# Patient Record
Sex: FEMALE | Race: BLACK OR AFRICAN AMERICAN | NOT HISPANIC OR LATINO | URBAN - NONMETROPOLITAN AREA
[De-identification: names, ages, dates, MRNs, and addresses within clinical notes are randomized per-mention and may not be internally consistent; named-entity substitution may affect disease eponyms.]

---

## 2021-12-29 ENCOUNTER — AMBULATORY SURGICAL CENTER (OUTPATIENT)
Dept: URBAN - NONMETROPOLITAN AREA SURGERY 4 | Facility: SURGERY | Age: 46
End: 2021-12-29

## 2021-12-29 VITALS
DIASTOLIC BLOOD PRESSURE: 61 MMHG | DIASTOLIC BLOOD PRESSURE: 50 MMHG | DIASTOLIC BLOOD PRESSURE: 64 MMHG | HEART RATE: 78 BPM | SYSTOLIC BLOOD PRESSURE: 116 MMHG | SYSTOLIC BLOOD PRESSURE: 95 MMHG | OXYGEN SATURATION: 95 % | HEIGHT: 67 IN | OXYGEN SATURATION: 99 % | DIASTOLIC BLOOD PRESSURE: 59 MMHG | OXYGEN SATURATION: 96 % | DIASTOLIC BLOOD PRESSURE: 64 MMHG | HEART RATE: 72 BPM | HEART RATE: 80 BPM | HEART RATE: 73 BPM | DIASTOLIC BLOOD PRESSURE: 72 MMHG | HEART RATE: 68 BPM | HEART RATE: 73 BPM | OXYGEN SATURATION: 100 % | OXYGEN SATURATION: 99 % | RESPIRATION RATE: 22 BRPM | SYSTOLIC BLOOD PRESSURE: 116 MMHG | WEIGHT: 142 LBS | DIASTOLIC BLOOD PRESSURE: 74 MMHG | SYSTOLIC BLOOD PRESSURE: 106 MMHG | SYSTOLIC BLOOD PRESSURE: 101 MMHG | DIASTOLIC BLOOD PRESSURE: 54 MMHG | OXYGEN SATURATION: 100 % | TEMPERATURE: 98 F | OXYGEN SATURATION: 100 % | SYSTOLIC BLOOD PRESSURE: 109 MMHG | SYSTOLIC BLOOD PRESSURE: 108 MMHG | DIASTOLIC BLOOD PRESSURE: 72 MMHG | SYSTOLIC BLOOD PRESSURE: 90 MMHG | HEART RATE: 68 BPM | TEMPERATURE: 98 F | DIASTOLIC BLOOD PRESSURE: 63 MMHG | DIASTOLIC BLOOD PRESSURE: 51 MMHG | SYSTOLIC BLOOD PRESSURE: 101 MMHG | SYSTOLIC BLOOD PRESSURE: 116 MMHG | RESPIRATION RATE: 16 BRPM | SYSTOLIC BLOOD PRESSURE: 112 MMHG | DIASTOLIC BLOOD PRESSURE: 75 MMHG | TEMPERATURE: 98.2 F | OXYGEN SATURATION: 98 % | DIASTOLIC BLOOD PRESSURE: 50 MMHG | TEMPERATURE: 98.2 F | SYSTOLIC BLOOD PRESSURE: 108 MMHG | HEART RATE: 71 BPM | DIASTOLIC BLOOD PRESSURE: 61 MMHG | SYSTOLIC BLOOD PRESSURE: 105 MMHG | OXYGEN SATURATION: 93 % | HEART RATE: 80 BPM | RESPIRATION RATE: 15 BRPM | SYSTOLIC BLOOD PRESSURE: 109 MMHG | DIASTOLIC BLOOD PRESSURE: 72 MMHG | SYSTOLIC BLOOD PRESSURE: 95 MMHG | HEART RATE: 71 BPM | HEART RATE: 78 BPM | OXYGEN SATURATION: 96 % | HEART RATE: 78 BPM | OXYGEN SATURATION: 98 % | HEIGHT: 67 IN | DIASTOLIC BLOOD PRESSURE: 75 MMHG | OXYGEN SATURATION: 93 % | SYSTOLIC BLOOD PRESSURE: 109 MMHG | OXYGEN SATURATION: 95 % | RESPIRATION RATE: 22 BRPM | HEART RATE: 87 BPM | DIASTOLIC BLOOD PRESSURE: 59 MMHG | HEART RATE: 87 BPM | HEART RATE: 68 BPM | HEART RATE: 73 BPM | RESPIRATION RATE: 22 BRPM | HEART RATE: 87 BPM | DIASTOLIC BLOOD PRESSURE: 75 MMHG | RESPIRATION RATE: 15 BRPM | HEART RATE: 71 BPM | SYSTOLIC BLOOD PRESSURE: 101 MMHG | OXYGEN SATURATION: 97 % | SYSTOLIC BLOOD PRESSURE: 77 MMHG | WEIGHT: 142 LBS | DIASTOLIC BLOOD PRESSURE: 51 MMHG | SYSTOLIC BLOOD PRESSURE: 112 MMHG | SYSTOLIC BLOOD PRESSURE: 113 MMHG | DIASTOLIC BLOOD PRESSURE: 61 MMHG | HEART RATE: 80 BPM | DIASTOLIC BLOOD PRESSURE: 64 MMHG | SYSTOLIC BLOOD PRESSURE: 106 MMHG | SYSTOLIC BLOOD PRESSURE: 106 MMHG | TEMPERATURE: 98.2 F | TEMPERATURE: 98 F | HEART RATE: 76 BPM | SYSTOLIC BLOOD PRESSURE: 108 MMHG | DIASTOLIC BLOOD PRESSURE: 63 MMHG | OXYGEN SATURATION: 96 % | SYSTOLIC BLOOD PRESSURE: 95 MMHG | SYSTOLIC BLOOD PRESSURE: 77 MMHG | SYSTOLIC BLOOD PRESSURE: 105 MMHG | SYSTOLIC BLOOD PRESSURE: 112 MMHG | SYSTOLIC BLOOD PRESSURE: 113 MMHG | HEART RATE: 72 BPM | RESPIRATION RATE: 17 BRPM | WEIGHT: 142 LBS | OXYGEN SATURATION: 98 % | HEART RATE: 76 BPM | DIASTOLIC BLOOD PRESSURE: 70 MMHG | OXYGEN SATURATION: 95 % | RESPIRATION RATE: 15 BRPM | SYSTOLIC BLOOD PRESSURE: 90 MMHG | DIASTOLIC BLOOD PRESSURE: 54 MMHG | DIASTOLIC BLOOD PRESSURE: 54 MMHG | DIASTOLIC BLOOD PRESSURE: 74 MMHG | RESPIRATION RATE: 17 BRPM | HEART RATE: 76 BPM | DIASTOLIC BLOOD PRESSURE: 51 MMHG | RESPIRATION RATE: 16 BRPM | OXYGEN SATURATION: 99 % | HEART RATE: 72 BPM | RESPIRATION RATE: 17 BRPM | DIASTOLIC BLOOD PRESSURE: 50 MMHG | DIASTOLIC BLOOD PRESSURE: 59 MMHG | OXYGEN SATURATION: 97 % | SYSTOLIC BLOOD PRESSURE: 77 MMHG | OXYGEN SATURATION: 93 % | SYSTOLIC BLOOD PRESSURE: 113 MMHG | DIASTOLIC BLOOD PRESSURE: 63 MMHG | SYSTOLIC BLOOD PRESSURE: 90 MMHG | DIASTOLIC BLOOD PRESSURE: 74 MMHG | DIASTOLIC BLOOD PRESSURE: 70 MMHG | RESPIRATION RATE: 16 BRPM | DIASTOLIC BLOOD PRESSURE: 70 MMHG | HEIGHT: 67 IN | SYSTOLIC BLOOD PRESSURE: 105 MMHG | OXYGEN SATURATION: 97 %

## 2021-12-29 DIAGNOSIS — K31.89 OTHER DISEASES OF STOMACH AND DUODENUM: ICD-10-CM

## 2021-12-29 DIAGNOSIS — D50.9 IRON DEFICIENCY ANEMIA, UNSPECIFIED: ICD-10-CM

## 2021-12-29 DIAGNOSIS — R13.10 DYSPHAGIA, UNSPECIFIED: ICD-10-CM

## 2021-12-29 PROCEDURE — 45378 DIAGNOSTIC COLONOSCOPY: CPT | Mod: GA,SG | Performed by: INTERNAL MEDICINE

## 2021-12-29 PROCEDURE — 43239 EGD BIOPSY SINGLE/MULTIPLE: CPT | Mod: 51,GA | Performed by: INTERNAL MEDICINE

## 2021-12-29 PROCEDURE — 88305 TISSUE EXAM BY PATHOLOGIST: CPT | Performed by: INTERNAL MEDICINE

## 2021-12-29 PROCEDURE — 45378 DIAGNOSTIC COLONOSCOPY: CPT | Mod: GA | Performed by: INTERNAL MEDICINE

## 2021-12-29 PROCEDURE — 43239 EGD BIOPSY SINGLE/MULTIPLE: CPT | Mod: 51,GA,SG | Performed by: INTERNAL MEDICINE

## 2021-12-29 PROCEDURE — 43239 EGD BIOPSY SINGLE/MULTIPLE: CPT | Mod: GA,51,SG | Performed by: INTERNAL MEDICINE

## 2021-12-29 PROCEDURE — 45378 DIAGNOSTIC COLONOSCOPY: CPT | Mod: SG,GA | Performed by: INTERNAL MEDICINE

## 2021-12-29 PROCEDURE — 00813 ANES UPR LWR GI NDSC PX: CPT | Mod: QZ,P2 | Performed by: NURSE ANESTHETIST, CERTIFIED REGISTERED

## 2021-12-29 PROCEDURE — 00813 ANES UPR LWR GI NDSC PX: CPT | Mod: P2,QZ | Performed by: NURSE ANESTHETIST, CERTIFIED REGISTERED

## 2022-02-14 ENCOUNTER — OFFICE (OUTPATIENT)
Dept: URBAN - NONMETROPOLITAN AREA CLINIC 5 | Facility: CLINIC | Age: 47
End: 2022-02-14

## 2022-02-14 VITALS
HEIGHT: 67 IN | WEIGHT: 145 LBS | HEART RATE: 71 BPM | DIASTOLIC BLOOD PRESSURE: 68 MMHG | SYSTOLIC BLOOD PRESSURE: 119 MMHG | RESPIRATION RATE: 16 BRPM

## 2022-02-14 DIAGNOSIS — D50.9 IRON DEFICIENCY ANEMIA, UNSPECIFIED: ICD-10-CM

## 2022-02-14 PROCEDURE — 99213 OFFICE O/P EST LOW 20 MIN: CPT | Performed by: INTERNAL MEDICINE

## 2022-06-13 ENCOUNTER — OFFICE (OUTPATIENT)
Dept: URBAN - NONMETROPOLITAN AREA CLINIC 5 | Facility: CLINIC | Age: 47
End: 2022-06-13

## 2022-06-13 VITALS
DIASTOLIC BLOOD PRESSURE: 62 MMHG | SYSTOLIC BLOOD PRESSURE: 107 MMHG | WEIGHT: 142 LBS | HEART RATE: 65 BPM | HEIGHT: 67 IN | RESPIRATION RATE: 16 BRPM

## 2022-06-13 DIAGNOSIS — D50.9 IRON DEFICIENCY ANEMIA, UNSPECIFIED: ICD-10-CM

## 2022-06-13 PROCEDURE — 99214 OFFICE O/P EST MOD 30 MIN: CPT | Performed by: INTERNAL MEDICINE

## 2022-06-13 NOTE — SERVICENOTES
Given patient's iron deficiency anemia with normal EGD, colonoscopy, partial capsule endoscopy do suspect blood loss secondary to heavy menstrual cycles.  She reports her blood counts were last 11.9 in April.  She continues on iron twice daily.  She did complete injectafer infusions since last evaluation.  She is scheduled to see the gynecologist next week.  Counseled her to continue to follow with her gynecologist and PCP.  She can follow up here as needed.

## 2022-06-13 NOTE — SERVICEHPINOTES
Enid Zapata   is a   46   year old  female   who is here today for routine follow up  for iron deficiency anemia.  Patient was originally referred for EGD and colonoscopy given iron deficiency anemia.  Blood work from November 2021 indicated hematocrit of 23 with an MCV of 63.  EGD and colonoscopy completed on 12/29/2021.  Both were unremarkable for source of blood loss.  Patient was started on ferrous sulfate 325 mg b.i.d..  A capsule endoscopy was completed.  Unfortunately the capsule did not reach the cecum.  There was a questionable bleeding AVM versus prior biopsy site in the duodenum.  No other source of blood loss was identified.  Hemoglobin electrophoresis was also completed and was unremarkable.    Patient previously did report having heavy menstrual cycles.  Prior evaluation repeat blood work revealed hemoglobin 10.4, MCV 76, iron 85, ferritin of 9.  Given persistent iron deficiency anemia  Injectafer infusions were completed.  She reports she completed these in March.  She last had blood work in April which revealed a hemoglobin of 11.9.    She is scheduled to see the gynecologist next week.  She reports her menstrual cycles remain quite heavy.   She denies any other complaints today.

## 2024-04-23 ENCOUNTER — OFFICE (OUTPATIENT)
Dept: URBAN - NONMETROPOLITAN AREA CLINIC 5 | Facility: CLINIC | Age: 49
End: 2024-04-23

## 2024-04-23 VITALS
HEART RATE: 56 BPM | WEIGHT: 140 LBS | SYSTOLIC BLOOD PRESSURE: 108 MMHG | HEIGHT: 67 IN | DIASTOLIC BLOOD PRESSURE: 66 MMHG | RESPIRATION RATE: 18 BRPM

## 2024-04-23 DIAGNOSIS — D50.9 IRON DEFICIENCY ANEMIA, UNSPECIFIED: ICD-10-CM

## 2024-04-23 DIAGNOSIS — K76.0 FATTY (CHANGE OF) LIVER, NOT ELSEWHERE CLASSIFIED: ICD-10-CM

## 2024-04-23 PROCEDURE — 99214 OFFICE O/P EST MOD 30 MIN: CPT | Performed by: INTERNAL MEDICINE

## 2024-04-23 NOTE — SERVICEHPINOTES
Enid Zapata   is a   48   year old  female  who presents for follow-up. Patient has not been seen in the clinic since February of 2022. Patient was originally referred for EGD and colonoscopy given iron deficiency anemia.  Blood work from November 2021 indicated hematocrit of 23 with an MCV of 63.  EGD and colonoscopy were completed on 12/29/2021.  Both were unremarkable for a source of blood loss.  Patient was started on ferrous sulfate 325 mg b.i.d..  A capsule endoscopy was completed.  Unfortunately the capsule did not reach the cecum.  There was a questionable bleeding AVM versus prior biopsy site in the duodenum.  No other source of blood loss was identified.  Hemoglobin electrophoresis was also completed and was unremarkable.    Patient previously did report having heavy menstrual cycles.  Repeat blood work revealed improvement in her hemoglobin 10.4, MCV 76, iron 85, ferritin of 9.  Given persistent iron deficiency anemia  Injectafer infusions were completed.  She reports she completed these in March.    Prior to last evaluation she had had recent blood work which showed improvement in her hemoglobin to 11.9.  She was scheduled to see a gynecologist the following week for her continued heavy menstrual cycles.  She has not been seen since that time.br
   Patient completed a recent ultrasound and CT scan which revealed mild hepatic steatosis and she was referred back for follow-up.  Patient reports she is doing well.  She denies any nausea, vomiting, dysphagia, odynophagia, diarrhea, constipation, bright red per rectum, melena, mucus in stools, jaundice, or confusion.  She denies any personal or family history of liver disease.  She denies smoking, marijuana use, or NSAID use.  She will occasionally drink alcohol.  She does report a family history of type 2 diabetes in her brother and her mother.  After last evaluation she was evaluated for hysterectomy but then was found to have mitral valve stenosis.  She underwent repair of this at the [facility].  She is now on aspirin, flecainide, furosemide, Toprol, and warfarin.  She was told she could not have a hysterectomy until she was 12 months postop.  This will be in June.  She will likely put this off until the end of the year as she plans to travel over the summer.  She reports her blood counts have not completely come back to normal.  She denies previously being told of elevated liver enzymes or liver disease.

## 2024-04-23 NOTE — SERVICENOTES
Counseled patient low concern for underlying liver disease.  Likely some mild hepatic steatosis secondary to medication use, dietary choices, possibly genetic due to family history of type 2 diabetes in both her mother and her brother.  Will plan to repeat blood work as above.  If liver enzymes are within normal limits she can follow up as needed.  If they are elevated will plan for serologic workup rule out other causes.  Will proceed with repeat iron studies.  Counseled her that suspected source of blood loss was heavy  menstrual cycles as well as mitral valve stenosis.  If despite hysterectomy she continues with anemia would need to consider repeating the video capsule endoscopy as this was never completed to the end of her small bowel, however, she has no alarm symptoms today with bright red per rectum, melena, weight loss, obstructive symptoms etc..